# Patient Record
Sex: FEMALE | HISPANIC OR LATINO | Employment: FULL TIME | URBAN - METROPOLITAN AREA
[De-identification: names, ages, dates, MRNs, and addresses within clinical notes are randomized per-mention and may not be internally consistent; named-entity substitution may affect disease eponyms.]

---

## 2021-06-18 ENCOUNTER — PREP FOR SURGERY (OUTPATIENT)
Dept: OTHER | Facility: HOSPITAL | Age: 41
End: 2021-06-18

## 2021-06-18 ENCOUNTER — ANESTHESIA EVENT (OUTPATIENT)
Dept: PERIOP | Facility: HOSPITAL | Age: 41
End: 2021-06-18

## 2021-06-18 ENCOUNTER — PRE-ADMISSION TESTING (OUTPATIENT)
Dept: PREADMISSION TESTING | Facility: HOSPITAL | Age: 41
End: 2021-06-18

## 2021-06-18 VITALS — BODY MASS INDEX: 25.11 KG/M2 | HEIGHT: 61 IN | WEIGHT: 133 LBS

## 2021-06-18 DIAGNOSIS — N92.0 MENORRHAGIA: ICD-10-CM

## 2021-06-18 DIAGNOSIS — N92.0 MENORRHAGIA: Primary | ICD-10-CM

## 2021-06-18 LAB
ABO GROUP BLD: NORMAL
B-HCG UR QL: NEGATIVE
BILIRUB UR QL STRIP: NEGATIVE
BLD GP AB SCN SERPL QL: NEGATIVE
CLARITY UR: CLEAR
COLOR UR: YELLOW
DEPRECATED RDW RBC AUTO: 44.4 FL (ref 37–54)
ERYTHROCYTE [DISTWIDTH] IN BLOOD BY AUTOMATED COUNT: 13.1 % (ref 12.3–15.4)
GLUCOSE UR STRIP-MCNC: NEGATIVE MG/DL
HCT VFR BLD AUTO: 44.5 % (ref 34–46.6)
HGB BLD-MCNC: 14.9 G/DL (ref 12–15.9)
HGB UR QL STRIP.AUTO: NEGATIVE
KETONES UR QL STRIP: ABNORMAL
LEUKOCYTE ESTERASE UR QL STRIP.AUTO: NEGATIVE
MCH RBC QN AUTO: 31 PG (ref 26.6–33)
MCHC RBC AUTO-ENTMCNC: 33.5 G/DL (ref 31.5–35.7)
MCV RBC AUTO: 92.7 FL (ref 79–97)
NITRITE UR QL STRIP: NEGATIVE
PH UR STRIP.AUTO: 6 [PH] (ref 5–8)
PLATELET # BLD AUTO: 336 10*3/MM3 (ref 140–450)
PMV BLD AUTO: 9.5 FL (ref 6–12)
PROT UR QL STRIP: NEGATIVE
RBC # BLD AUTO: 4.8 10*6/MM3 (ref 3.77–5.28)
RH BLD: POSITIVE
SARS-COV-2 RNA NOSE QL NAA+PROBE: NOT DETECTED
SP GR UR STRIP: 1.01 (ref 1–1.03)
T&S EXPIRATION DATE: NORMAL
UROBILINOGEN UR QL STRIP: ABNORMAL
WBC # BLD AUTO: 8.82 10*3/MM3 (ref 3.4–10.8)

## 2021-06-18 PROCEDURE — 86850 RBC ANTIBODY SCREEN: CPT

## 2021-06-18 PROCEDURE — 81003 URINALYSIS AUTO W/O SCOPE: CPT

## 2021-06-18 PROCEDURE — C9803 HOPD COVID-19 SPEC COLLECT: HCPCS

## 2021-06-18 PROCEDURE — 85027 COMPLETE CBC AUTOMATED: CPT

## 2021-06-18 PROCEDURE — U0004 COV-19 TEST NON-CDC HGH THRU: HCPCS

## 2021-06-18 PROCEDURE — 86900 BLOOD TYPING SEROLOGIC ABO: CPT

## 2021-06-18 PROCEDURE — 86901 BLOOD TYPING SEROLOGIC RH(D): CPT

## 2021-06-18 PROCEDURE — 81025 URINE PREGNANCY TEST: CPT

## 2021-06-18 PROCEDURE — 36415 COLL VENOUS BLD VENIPUNCTURE: CPT

## 2021-06-18 RX ORDER — CEFAZOLIN SODIUM 2 G/100ML
2 INJECTION, SOLUTION INTRAVENOUS ONCE
Status: CANCELLED | OUTPATIENT
Start: 2021-06-18 | End: 2021-06-18

## 2021-06-18 RX ORDER — HEPARIN SODIUM 5000 [USP'U]/ML
5000 INJECTION, SOLUTION INTRAVENOUS; SUBCUTANEOUS ONCE
Status: CANCELLED | OUTPATIENT
Start: 2021-06-18 | End: 2021-06-18

## 2021-06-18 RX ORDER — SCOLOPAMINE TRANSDERMAL SYSTEM 1 MG/1
1 PATCH, EXTENDED RELEASE TRANSDERMAL ONCE
Status: CANCELLED | OUTPATIENT
Start: 2021-06-18 | End: 2021-06-18

## 2021-06-18 RX ORDER — ACETAMINOPHEN 500 MG
500 TABLET ORAL EVERY 6 HOURS PRN
COMMUNITY

## 2021-06-18 RX ORDER — FAMOTIDINE 10 MG/ML
20 INJECTION, SOLUTION INTRAVENOUS ONCE
Status: CANCELLED | OUTPATIENT
Start: 2021-06-18 | End: 2021-06-18

## 2021-06-18 NOTE — H&P (VIEW-ONLY)
Vital Signs    LMP: 2021           Weight:  132.4lb (60.06kg)  BMI: 25.95  Height: 60in    BP #1: 126/74       Position: sitting           Vitals performed by:  Marsha Aparicio......................2021 1:01 PM      Current Problems (Reviewed Today)  Preoperative examination (ICD-V72.84) (EBZ14-A98.818)  Fibroids (ICD-218.9) (JPH12-M95.9)  Menorrhagia (ICD-626.2) (UDS00-T67.0)  Annual without abnormal findings (ICD-V72.31) (GHB12-G55.419)    Current Medications (Reviewed Today)  No Known Medications    Current Allergies (Reviewed Today)  No Known Allergies      History of Present Illness  Visit Type: Pre OP  Chief Complaint: TRH BSO    The patient presents with menstrual disorder. The patient complains of heavy periods, painful periods and periods closer together. Associated symptoms include passing clots, soiling clothing/bedding, bleeding limiting ADLs and fatigue. Interval between menses is 21-25 days. Number of pads used per day is > 10 and requires double protection. Menstrual flow lasts > 10 days. Medications used include OCP/patch/ring. Significant history includes fibroids and adenomyosis. Previous diagnostic workup includes ultrasound and endometrial biopsy. Symptoms have been present for several years.       Past Medical History  --- Past Medical History reviewed   none listed     Surgical History  ---- Surgical History reviewed   gallbladder     Family History  ---- Family History reviewed  Diabetes     Gynecologic History  Menarche (yrs):  11  LMP Date: 2021  LMP Flow: Medium  Usual Flow: Medium  Frequency: 28  Duration (days): 3-7    Sexual Health   Sexually Active? Past  Current Contraception: tubal ligation   Hx Tubal Ligation  History of STDs? no    Obstetrical History  : 3  Para: 2  Livin  Miscarriages: 1      Social History  --- Social History reviewed    Risk Factors  Tobacco use: Current every day smoker       Cigarettes: yes            Packs/day: 1/2            Year  started: age 20  Passive smoke exposure:  yes      Genetic History  --- Genetic History reviewed  Patient Ethnicity:  or       Review of Systems    Constitutional: Denies fevers, chills, unintentional weight change.   Skin: Denies rash, suspicious lesions.   Eyes: Denies irritation, vision changes, eye pain.   ENT: Denies earache, nosebleeds, sore throat.   Breast: Denies tenderness, mass, skin changes.   Cardiovascular: Denies palpitations, chest pain, syncope.   Respiratory: Denies cough, dyspnea, wheezing.   GI: Denies nausea, vomiting, constipation.   : Complains of ABNORMAL BLEEDING, MENORRHAGIA. Denies pelvic pain, urinary frequency.   Musculoskeletal: Denies back pain, joint pain, muscle weakness.   Neurological: Denies headache, loss of consciousness, numbness.   Psychological: Denies anxiety, depression, hallucinations.   Endocrine: Denies cold intolerance, heat intolerance, polyuria.   Heme/Lymphatic: Denies abnormal bruising, abnormal bleeding, enlarged lymph nodes.   Allergic/Immun: Denies urticaria, hay fever, persistent infection.     See HPI, All other systems reviewed and are negative.      Physical Exam    General Appearance: no acute distress, healthy appearing, well nourished, well developed.   Abdomen: nontender, nondistended, no guarding, no rebound, no masses.   Vulva: normal appearance, no masses, no lesions.   Urethra: no masses, nontender, no discharge.   Bladder/Renal: nontender, no bladder distension.   Vagina: nontender, no erythema, no masses.   Cervix: normal, no motion tenderness, no lesions, no discharge.   Uterus: normal size, normal shape, nontender, mobile.   Adnexa: no masses, nontender.   Perineum: no masses, no lesions.   Anus/Rectum: normal perianal skin, no masses visible.   Lymphatic: no inguinal lymphadenopathy.   Psych: mood and affect appropriate, normal interaction, content and flow of speech normal.   Skin: no rashes, no lesions.         Assessment &  Plan  - PREOPERATIVE EXAMINATION (DFV89-U30.818)  Plan: Exceeding half of the total visit time, 25 minutes spent with patient discussing possbile diagnosis and treatment options in addition to any other services that may have been provided today.    We are scheduling her for a TRH with a bilateral salp;ingectomy    Services Completed Today  Pre Op Visit-[id number] [[id number]]    Tests Ordered Today  No orders placed    Referrals Placed Today  No orders placed  Patient's diagnosis and possible treatment options were again reviewed, including lesser invasive options.  The risks, benefits and likely outcomes of each were reviewed.  The patient confirms her desire to proceed with TRH with possible BSO.  The procedure was again defined as robotic removal of uterus, cervix and possible removal of the fallopian tubes and ovaries.  She has asked me to use my judgement the the decision to remove the ovaries at the time of surgical exploration.  I have informed her that clinical suspicion of endometriosis cannot always be confirmed with pathological examination of the surgical specimen.  Recognized risks of this procedure are those of any laparoscopic surgical procedure including anesthesia, bleeding, infection, damage to bowel or bladder and neurologic damage.  Specific risks of this procedure were also reviewed including ureteral damage, DVT and its sequelae, vaginal cuff hematoma or infection, wound dehiscence, ovarian remnants, as well as the general management of these complications.  She was informed of the possible need to convert the laparoscopic procedure to an open procedure after the induction of anesthesia.  Possible indications for post operative ERT and its risks and benefits were reviewed.  She was also advised that bleeding, bowel injury, bladder injury or ureteral injury may not be obvious at the time of surgery and that she would need to be viligent to call my office ASAP with any increasing abdominopelvic pain  or any fever in the two weeks following surgery.  All of the patients questions were answered and she was advised to call with any other questions that she may have prior to the operation.        Electronically signed by Aleja Freitas DO on 06/18/2021 at 1:36 PM    ________________________________________________________________________

## 2021-06-18 NOTE — H&P
Vital Signs    LMP: 2021           Weight:  132.4lb (60.06kg)  BMI: 25.95  Height: 60in    BP #1: 126/74       Position: sitting           Vitals performed by:  Marsha Aparicio......................2021 1:01 PM      Current Problems (Reviewed Today)  Preoperative examination (ICD-V72.84) (BIZ19-E39.818)  Fibroids (ICD-218.9) (EPE91-J18.9)  Menorrhagia (ICD-626.2) (CYW60-C65.0)  Annual without abnormal findings (ICD-V72.31) (SXV24-H21.419)    Current Medications (Reviewed Today)  No Known Medications    Current Allergies (Reviewed Today)  No Known Allergies      History of Present Illness  Visit Type: Pre OP  Chief Complaint: TRH BSO    The patient presents with menstrual disorder. The patient complains of heavy periods, painful periods and periods closer together. Associated symptoms include passing clots, soiling clothing/bedding, bleeding limiting ADLs and fatigue. Interval between menses is 21-25 days. Number of pads used per day is > 10 and requires double protection. Menstrual flow lasts > 10 days. Medications used include OCP/patch/ring. Significant history includes fibroids and adenomyosis. Previous diagnostic workup includes ultrasound and endometrial biopsy. Symptoms have been present for several years.       Past Medical History  --- Past Medical History reviewed   none listed     Surgical History  ---- Surgical History reviewed   gallbladder     Family History  ---- Family History reviewed  Diabetes     Gynecologic History  Menarche (yrs):  11  LMP Date: 2021  LMP Flow: Medium  Usual Flow: Medium  Frequency: 28  Duration (days): 3-7    Sexual Health   Sexually Active? Past  Current Contraception: tubal ligation   Hx Tubal Ligation  History of STDs? no    Obstetrical History  : 3  Para: 2  Livin  Miscarriages: 1      Social History  --- Social History reviewed    Risk Factors  Tobacco use: Current every day smoker       Cigarettes: yes            Packs/day: 1/2            Year  started: age 20  Passive smoke exposure:  yes      Genetic History  --- Genetic History reviewed  Patient Ethnicity:  or       Review of Systems    Constitutional: Denies fevers, chills, unintentional weight change.   Skin: Denies rash, suspicious lesions.   Eyes: Denies irritation, vision changes, eye pain.   ENT: Denies earache, nosebleeds, sore throat.   Breast: Denies tenderness, mass, skin changes.   Cardiovascular: Denies palpitations, chest pain, syncope.   Respiratory: Denies cough, dyspnea, wheezing.   GI: Denies nausea, vomiting, constipation.   : Complains of ABNORMAL BLEEDING, MENORRHAGIA. Denies pelvic pain, urinary frequency.   Musculoskeletal: Denies back pain, joint pain, muscle weakness.   Neurological: Denies headache, loss of consciousness, numbness.   Psychological: Denies anxiety, depression, hallucinations.   Endocrine: Denies cold intolerance, heat intolerance, polyuria.   Heme/Lymphatic: Denies abnormal bruising, abnormal bleeding, enlarged lymph nodes.   Allergic/Immun: Denies urticaria, hay fever, persistent infection.     See HPI, All other systems reviewed and are negative.      Physical Exam    General Appearance: no acute distress, healthy appearing, well nourished, well developed.   Abdomen: nontender, nondistended, no guarding, no rebound, no masses.   Vulva: normal appearance, no masses, no lesions.   Urethra: no masses, nontender, no discharge.   Bladder/Renal: nontender, no bladder distension.   Vagina: nontender, no erythema, no masses.   Cervix: normal, no motion tenderness, no lesions, no discharge.   Uterus: normal size, normal shape, nontender, mobile.   Adnexa: no masses, nontender.   Perineum: no masses, no lesions.   Anus/Rectum: normal perianal skin, no masses visible.   Lymphatic: no inguinal lymphadenopathy.   Psych: mood and affect appropriate, normal interaction, content and flow of speech normal.   Skin: no rashes, no lesions.         Assessment &  Plan  - PREOPERATIVE EXAMINATION (WQZ48-O86.818)  Plan: Exceeding half of the total visit time, 25 minutes spent with patient discussing possbile diagnosis and treatment options in addition to any other services that may have been provided today.    We are scheduling her for a TRH with a bilateral salp;ingectomy    Services Completed Today  Pre Op Visit-31548 [[id number]]    Tests Ordered Today  No orders placed    Referrals Placed Today  No orders placed  Patient's diagnosis and possible treatment options were again reviewed, including lesser invasive options.  The risks, benefits and likely outcomes of each were reviewed.  The patient confirms her desire to proceed with TRH with possible BSO.  The procedure was again defined as robotic removal of uterus, cervix and possible removal of the fallopian tubes and ovaries.  She has asked me to use my judgement the the decision to remove the ovaries at the time of surgical exploration.  I have informed her that clinical suspicion of endometriosis cannot always be confirmed with pathological examination of the surgical specimen.  Recognized risks of this procedure are those of any laparoscopic surgical procedure including anesthesia, bleeding, infection, damage to bowel or bladder and neurologic damage.  Specific risks of this procedure were also reviewed including ureteral damage, DVT and its sequelae, vaginal cuff hematoma or infection, wound dehiscence, ovarian remnants, as well as the general management of these complications.  She was informed of the possible need to convert the laparoscopic procedure to an open procedure after the induction of anesthesia.  Possible indications for post operative ERT and its risks and benefits were reviewed.  She was also advised that bleeding, bowel injury, bladder injury or ureteral injury may not be obvious at the time of surgery and that she would need to be viligent to call my office ASAP with any increasing abdominopelvic pain  or any fever in the two weeks following surgery.  All of the patients questions were answered and she was advised to call with any other questions that she may have prior to the operation.        Electronically signed by Aleja Freitas DO on 06/18/2021 at 1:36 PM    ________________________________________________________________________

## 2021-06-18 NOTE — PAT
Patient to apply Chlorhexadine wipes  to surgical area (as instructed) the night before procedure and the AM of procedure. Wipes provided.    Patient instructed to drink 20 ounces (or until full) of Gatorade and it needs to be completed 1 hour before given arrival time for procedure (NO RED Gatorade)    Patient verbalized understanding.    Blood bank bracelet applied to patient during Pre Admission Testing visit.  Patient instructed not to remove from arm until after procedure and they are discharged from the hospital.  Explained to patient that they may shower and get the bracelet wet, but not to immerse under water for longer periods (bathing, swimming, hand dishwashing, etc).  Patient verbalized understanding.    Karla Do done in PAT.

## 2021-06-21 ENCOUNTER — HOSPITAL ENCOUNTER (OUTPATIENT)
Facility: HOSPITAL | Age: 41
Discharge: HOME | End: 2021-06-21
Attending: OBSTETRICS & GYNECOLOGY | Admitting: OBSTETRICS & GYNECOLOGY

## 2021-06-21 ENCOUNTER — ANESTHESIA (OUTPATIENT)
Dept: PERIOP | Facility: HOSPITAL | Age: 41
End: 2021-06-21

## 2021-06-21 VITALS
TEMPERATURE: 97.2 F | HEART RATE: 84 BPM | SYSTOLIC BLOOD PRESSURE: 121 MMHG | HEIGHT: 61 IN | OXYGEN SATURATION: 92 % | DIASTOLIC BLOOD PRESSURE: 69 MMHG | RESPIRATION RATE: 16 BRPM | WEIGHT: 133 LBS | BODY MASS INDEX: 25.11 KG/M2

## 2021-06-21 DIAGNOSIS — D21.9 FIBROIDS: ICD-10-CM

## 2021-06-21 DIAGNOSIS — N92.0 MENORRHAGIA: ICD-10-CM

## 2021-06-21 LAB
ABO GROUP BLD: NORMAL
B-HCG UR QL: NEGATIVE
INTERNAL NEGATIVE CONTROL: NORMAL
INTERNAL POSITIVE CONTROL: NORMAL
Lab: NORMAL
RH BLD: POSITIVE

## 2021-06-21 PROCEDURE — C1889 IMPLANT/INSERT DEVICE, NOC: HCPCS | Performed by: OBSTETRICS & GYNECOLOGY

## 2021-06-21 PROCEDURE — 58571 TLH W/T/O 250 G OR LESS: CPT | Performed by: PHYSICIAN ASSISTANT

## 2021-06-21 PROCEDURE — 86900 BLOOD TYPING SEROLOGIC ABO: CPT

## 2021-06-21 PROCEDURE — 25010000002 FENTANYL CITRATE (PF) 50 MCG/ML SOLUTION: Performed by: NURSE ANESTHETIST, CERTIFIED REGISTERED

## 2021-06-21 PROCEDURE — 25010000002 ONDANSETRON PER 1 MG: Performed by: NURSE ANESTHETIST, CERTIFIED REGISTERED

## 2021-06-21 PROCEDURE — 25010000002 NEOSTIGMINE 10 MG/10ML SOLUTION: Performed by: NURSE ANESTHETIST, CERTIFIED REGISTERED

## 2021-06-21 PROCEDURE — 25010000002 PROPOFOL 10 MG/ML EMULSION: Performed by: NURSE ANESTHETIST, CERTIFIED REGISTERED

## 2021-06-21 PROCEDURE — 88305 TISSUE EXAM BY PATHOLOGIST: CPT | Performed by: OBSTETRICS & GYNECOLOGY

## 2021-06-21 PROCEDURE — 81025 URINE PREGNANCY TEST: CPT | Performed by: ANESTHESIOLOGY

## 2021-06-21 PROCEDURE — 25010000002 DEXAMETHASONE PER 1 MG: Performed by: NURSE ANESTHETIST, CERTIFIED REGISTERED

## 2021-06-21 PROCEDURE — 25010000003 CEFAZOLIN IN DEXTROSE 2-4 GM/100ML-% SOLUTION: Performed by: OBSTETRICS & GYNECOLOGY

## 2021-06-21 PROCEDURE — 25010000002 MIDAZOLAM PER 1 MG: Performed by: NURSE ANESTHETIST, CERTIFIED REGISTERED

## 2021-06-21 PROCEDURE — 25010000002 KETOROLAC TROMETHAMINE PER 15 MG: Performed by: NURSE ANESTHETIST, CERTIFIED REGISTERED

## 2021-06-21 PROCEDURE — 86901 BLOOD TYPING SEROLOGIC RH(D): CPT

## 2021-06-21 PROCEDURE — 25010000002 HEPARIN (PORCINE) PER 1000 UNITS: Performed by: OBSTETRICS & GYNECOLOGY

## 2021-06-21 DEVICE — DEV CONTRL TISS STRATAFIX SPIRAL PD V7 SH 2-0 1/2 30CM: Type: IMPLANTABLE DEVICE | Site: ABDOMEN | Status: FUNCTIONAL

## 2021-06-21 RX ORDER — SCOLOPAMINE TRANSDERMAL SYSTEM 1 MG/1
1 PATCH, EXTENDED RELEASE TRANSDERMAL ONCE
Status: DISCONTINUED | OUTPATIENT
Start: 2021-06-21 | End: 2021-06-21 | Stop reason: HOSPADM

## 2021-06-21 RX ORDER — ALUMINA, MAGNESIA, AND SIMETHICONE 2400; 2400; 240 MG/30ML; MG/30ML; MG/30ML
15 SUSPENSION ORAL EVERY 6 HOURS PRN
Status: CANCELLED | OUTPATIENT
Start: 2021-06-21

## 2021-06-21 RX ORDER — SIMETHICONE 80 MG
80 TABLET,CHEWABLE ORAL 4 TIMES DAILY PRN
Status: CANCELLED | OUTPATIENT
Start: 2021-06-21

## 2021-06-21 RX ORDER — HEPARIN SODIUM 5000 [USP'U]/ML
INJECTION, SOLUTION INTRAVENOUS; SUBCUTANEOUS AS NEEDED
Status: DISCONTINUED | OUTPATIENT
Start: 2021-06-21 | End: 2021-06-21 | Stop reason: HOSPADM

## 2021-06-21 RX ORDER — FENTANYL CITRATE 50 UG/ML
25 INJECTION, SOLUTION INTRAMUSCULAR; INTRAVENOUS
Status: CANCELLED | OUTPATIENT
Start: 2021-06-21

## 2021-06-21 RX ORDER — ACETAMINOPHEN 160 MG/5ML
650 SOLUTION ORAL EVERY 4 HOURS PRN
Status: CANCELLED | OUTPATIENT
Start: 2021-06-21

## 2021-06-21 RX ORDER — ONDANSETRON 2 MG/ML
4 INJECTION INTRAMUSCULAR; INTRAVENOUS ONCE AS NEEDED
Status: DISCONTINUED | OUTPATIENT
Start: 2021-06-21 | End: 2021-06-21 | Stop reason: HOSPADM

## 2021-06-21 RX ORDER — BUPIVACAINE HYDROCHLORIDE AND EPINEPHRINE 5; 5 MG/ML; UG/ML
INJECTION, SOLUTION PERINEURAL AS NEEDED
Status: DISCONTINUED | OUTPATIENT
Start: 2021-06-21 | End: 2021-06-21 | Stop reason: HOSPADM

## 2021-06-21 RX ORDER — KETOROLAC TROMETHAMINE 30 MG/ML
INJECTION, SOLUTION INTRAMUSCULAR; INTRAVENOUS AS NEEDED
Status: DISCONTINUED | OUTPATIENT
Start: 2021-06-21 | End: 2021-06-21 | Stop reason: SURG

## 2021-06-21 RX ORDER — DEXAMETHASONE SODIUM PHOSPHATE 10 MG/ML
INJECTION INTRAMUSCULAR; INTRAVENOUS AS NEEDED
Status: DISCONTINUED | OUTPATIENT
Start: 2021-06-21 | End: 2021-06-21 | Stop reason: SURG

## 2021-06-21 RX ORDER — HEPARIN SODIUM 5000 [USP'U]/ML
5000 INJECTION, SOLUTION INTRAVENOUS; SUBCUTANEOUS ONCE
Status: DISCONTINUED | OUTPATIENT
Start: 2021-06-21 | End: 2021-06-21 | Stop reason: HOSPADM

## 2021-06-21 RX ORDER — DEXAMETHASONE SODIUM PHOSPHATE 4 MG/ML
8 INJECTION, SOLUTION INTRA-ARTICULAR; INTRALESIONAL; INTRAMUSCULAR; INTRAVENOUS; SOFT TISSUE ONCE AS NEEDED
Status: DISCONTINUED | OUTPATIENT
Start: 2021-06-21 | End: 2021-06-21 | Stop reason: HOSPADM

## 2021-06-21 RX ORDER — HYDROCODONE BITARTRATE AND ACETAMINOPHEN 5; 325 MG/1; MG/1
1 TABLET ORAL ONCE AS NEEDED
Status: COMPLETED | OUTPATIENT
Start: 2021-06-21 | End: 2021-06-21

## 2021-06-21 RX ORDER — HYDROMORPHONE HYDROCHLORIDE 1 MG/ML
0.5 INJECTION, SOLUTION INTRAMUSCULAR; INTRAVENOUS; SUBCUTANEOUS
Status: DISCONTINUED | OUTPATIENT
Start: 2021-06-21 | End: 2021-06-21 | Stop reason: HOSPADM

## 2021-06-21 RX ORDER — CIPROFLOXACIN 500 MG/1
500 TABLET, FILM COATED ORAL 2 TIMES DAILY
Qty: 10 TABLET | Refills: 0 | Status: SHIPPED | OUTPATIENT
Start: 2021-06-21 | End: 2021-06-26

## 2021-06-21 RX ORDER — SODIUM CHLORIDE, SODIUM LACTATE, POTASSIUM CHLORIDE, CALCIUM CHLORIDE 600; 310; 30; 20 MG/100ML; MG/100ML; MG/100ML; MG/100ML
9 INJECTION, SOLUTION INTRAVENOUS CONTINUOUS
Status: DISCONTINUED | OUTPATIENT
Start: 2021-06-21 | End: 2021-06-21 | Stop reason: HOSPADM

## 2021-06-21 RX ORDER — ONDANSETRON 2 MG/ML
4 INJECTION INTRAMUSCULAR; INTRAVENOUS EVERY 6 HOURS PRN
Status: CANCELLED | OUTPATIENT
Start: 2021-06-21

## 2021-06-21 RX ORDER — FENTANYL CITRATE 50 UG/ML
50 INJECTION, SOLUTION INTRAMUSCULAR; INTRAVENOUS
Status: DISCONTINUED | OUTPATIENT
Start: 2021-06-21 | End: 2021-06-21 | Stop reason: HOSPADM

## 2021-06-21 RX ORDER — IBUPROFEN 800 MG/1
800 TABLET ORAL EVERY 6 HOURS PRN
Qty: 30 TABLET | Refills: 1 | Status: SHIPPED | OUTPATIENT
Start: 2021-06-21 | End: 2021-07-21

## 2021-06-21 RX ORDER — SODIUM CHLORIDE 0.9 % (FLUSH) 0.9 %
10 SYRINGE (ML) INJECTION EVERY 12 HOURS SCHEDULED
Status: DISCONTINUED | OUTPATIENT
Start: 2021-06-21 | End: 2021-06-21 | Stop reason: HOSPADM

## 2021-06-21 RX ORDER — MIDAZOLAM HYDROCHLORIDE 1 MG/ML
1 INJECTION INTRAMUSCULAR; INTRAVENOUS
Status: DISCONTINUED | OUTPATIENT
Start: 2021-06-21 | End: 2021-06-21 | Stop reason: HOSPADM

## 2021-06-21 RX ORDER — NALOXONE HCL 0.4 MG/ML
0.4 VIAL (ML) INJECTION
Status: CANCELLED | OUTPATIENT
Start: 2021-06-21

## 2021-06-21 RX ORDER — PROPOFOL 10 MG/ML
VIAL (ML) INTRAVENOUS AS NEEDED
Status: DISCONTINUED | OUTPATIENT
Start: 2021-06-21 | End: 2021-06-21 | Stop reason: SURG

## 2021-06-21 RX ORDER — ONDANSETRON 2 MG/ML
INJECTION INTRAMUSCULAR; INTRAVENOUS AS NEEDED
Status: DISCONTINUED | OUTPATIENT
Start: 2021-06-21 | End: 2021-06-21 | Stop reason: SURG

## 2021-06-21 RX ORDER — MAGNESIUM HYDROXIDE 1200 MG/15ML
LIQUID ORAL AS NEEDED
Status: DISCONTINUED | OUTPATIENT
Start: 2021-06-21 | End: 2021-06-21 | Stop reason: HOSPADM

## 2021-06-21 RX ORDER — KETOROLAC TROMETHAMINE 15 MG/ML
15 INJECTION, SOLUTION INTRAMUSCULAR; INTRAVENOUS EVERY 6 HOURS
Status: DISCONTINUED | OUTPATIENT
Start: 2021-06-21 | End: 2021-06-21 | Stop reason: HOSPADM

## 2021-06-21 RX ORDER — LIDOCAINE HYDROCHLORIDE 10 MG/ML
0.5 INJECTION, SOLUTION EPIDURAL; INFILTRATION; INTRACAUDAL; PERINEURAL ONCE AS NEEDED
Status: COMPLETED | OUTPATIENT
Start: 2021-06-21 | End: 2021-06-21

## 2021-06-21 RX ORDER — KETOROLAC TROMETHAMINE 30 MG/ML
30 INJECTION, SOLUTION INTRAMUSCULAR; INTRAVENOUS EVERY 6 HOURS PRN
Status: CANCELLED | OUTPATIENT
Start: 2021-06-21 | End: 2021-06-21

## 2021-06-21 RX ORDER — NEOSTIGMINE METHYLSULFATE 1 MG/ML
INJECTION, SOLUTION INTRAVENOUS AS NEEDED
Status: DISCONTINUED | OUTPATIENT
Start: 2021-06-21 | End: 2021-06-21 | Stop reason: SURG

## 2021-06-21 RX ORDER — ONDANSETRON 4 MG/1
4 TABLET, FILM COATED ORAL EVERY 6 HOURS PRN
Status: CANCELLED | OUTPATIENT
Start: 2021-06-21

## 2021-06-21 RX ORDER — HYDROCODONE BITARTRATE AND ACETAMINOPHEN 5; 325 MG/1; MG/1
1 TABLET ORAL EVERY 6 HOURS PRN
Qty: 10 TABLET | Refills: 0 | Status: SHIPPED | OUTPATIENT
Start: 2021-06-21 | End: 2021-07-01

## 2021-06-21 RX ORDER — ROCURONIUM BROMIDE 10 MG/ML
INJECTION, SOLUTION INTRAVENOUS AS NEEDED
Status: DISCONTINUED | OUTPATIENT
Start: 2021-06-21 | End: 2021-06-21 | Stop reason: SURG

## 2021-06-21 RX ORDER — HYDROCODONE BITARTRATE AND ACETAMINOPHEN 5; 325 MG/1; MG/1
1 TABLET ORAL EVERY 4 HOURS PRN
Status: CANCELLED | OUTPATIENT
Start: 2021-06-21 | End: 2021-06-28

## 2021-06-21 RX ORDER — ESMOLOL HYDROCHLORIDE 10 MG/ML
INJECTION INTRAVENOUS AS NEEDED
Status: DISCONTINUED | OUTPATIENT
Start: 2021-06-21 | End: 2021-06-21 | Stop reason: SURG

## 2021-06-21 RX ORDER — FAMOTIDINE 20 MG/1
20 TABLET, FILM COATED ORAL ONCE
Status: COMPLETED | OUTPATIENT
Start: 2021-06-21 | End: 2021-06-21

## 2021-06-21 RX ORDER — MIDAZOLAM HYDROCHLORIDE 1 MG/ML
INJECTION INTRAMUSCULAR; INTRAVENOUS AS NEEDED
Status: DISCONTINUED | OUTPATIENT
Start: 2021-06-21 | End: 2021-06-21 | Stop reason: SURG

## 2021-06-21 RX ORDER — CEFAZOLIN SODIUM 2 G/100ML
2 INJECTION, SOLUTION INTRAVENOUS ONCE
Status: COMPLETED | OUTPATIENT
Start: 2021-06-21 | End: 2021-06-21

## 2021-06-21 RX ORDER — LIDOCAINE HYDROCHLORIDE 20 MG/ML
INJECTION, SOLUTION INFILTRATION; PERINEURAL AS NEEDED
Status: DISCONTINUED | OUTPATIENT
Start: 2021-06-21 | End: 2021-06-21 | Stop reason: SURG

## 2021-06-21 RX ORDER — SODIUM CHLORIDE 0.9 % (FLUSH) 0.9 %
10 SYRINGE (ML) INJECTION AS NEEDED
Status: DISCONTINUED | OUTPATIENT
Start: 2021-06-21 | End: 2021-06-21 | Stop reason: HOSPADM

## 2021-06-21 RX ORDER — GLYCOPYRROLATE 0.2 MG/ML
INJECTION INTRAMUSCULAR; INTRAVENOUS AS NEEDED
Status: DISCONTINUED | OUTPATIENT
Start: 2021-06-21 | End: 2021-06-21 | Stop reason: SURG

## 2021-06-21 RX ORDER — ACETAMINOPHEN 500 MG
1000 TABLET ORAL EVERY 8 HOURS
Status: CANCELLED | OUTPATIENT
Start: 2021-06-21

## 2021-06-21 RX ORDER — SODIUM CHLORIDE 9 MG/ML
INJECTION, SOLUTION INTRAVENOUS AS NEEDED
Status: DISCONTINUED | OUTPATIENT
Start: 2021-06-21 | End: 2021-06-21 | Stop reason: HOSPADM

## 2021-06-21 RX ORDER — FENTANYL CITRATE 50 UG/ML
INJECTION, SOLUTION INTRAMUSCULAR; INTRAVENOUS AS NEEDED
Status: DISCONTINUED | OUTPATIENT
Start: 2021-06-21 | End: 2021-06-21 | Stop reason: SURG

## 2021-06-21 RX ADMIN — DEXAMETHASONE SODIUM PHOSPHATE 4 MG: 10 INJECTION INTRAMUSCULAR; INTRAVENOUS at 12:41

## 2021-06-21 RX ADMIN — FENTANYL CITRATE 25 MCG: 50 INJECTION INTRAMUSCULAR; INTRAVENOUS at 14:37

## 2021-06-21 RX ADMIN — LIDOCAINE HYDROCHLORIDE 50 MG: 20 INJECTION, SOLUTION INFILTRATION; PERINEURAL at 12:11

## 2021-06-21 RX ADMIN — ESMOLOL HYDROCHLORIDE 10 MG: 10 INJECTION, SOLUTION INTRAVENOUS at 12:32

## 2021-06-21 RX ADMIN — NEOSTIGMINE 2.5 MG: 1 INJECTION INTRAVENOUS at 13:27

## 2021-06-21 RX ADMIN — ESMOLOL HYDROCHLORIDE 10 MG: 10 INJECTION, SOLUTION INTRAVENOUS at 12:36

## 2021-06-21 RX ADMIN — SODIUM CHLORIDE, POTASSIUM CHLORIDE, SODIUM LACTATE AND CALCIUM CHLORIDE 9 ML/HR: 600; 310; 30; 20 INJECTION, SOLUTION INTRAVENOUS at 10:20

## 2021-06-21 RX ADMIN — FENTANYL CITRATE 50 MCG: 50 INJECTION, SOLUTION INTRAMUSCULAR; INTRAVENOUS at 12:19

## 2021-06-21 RX ADMIN — FAMOTIDINE 20 MG: 20 TABLET ORAL at 10:31

## 2021-06-21 RX ADMIN — LIDOCAINE HYDROCHLORIDE 0.5 ML: 10 INJECTION, SOLUTION EPIDURAL; INFILTRATION; INTRACAUDAL; PERINEURAL at 10:20

## 2021-06-21 RX ADMIN — HYDROCODONE BITARTRATE AND ACETAMINOPHEN 1 TABLET: 5; 325 TABLET ORAL at 14:11

## 2021-06-21 RX ADMIN — ROCURONIUM BROMIDE 10 MG: 10 INJECTION, SOLUTION INTRAVENOUS at 12:56

## 2021-06-21 RX ADMIN — KETOROLAC TROMETHAMINE 30 MG: 30 INJECTION, SOLUTION INTRAMUSCULAR; INTRAVENOUS at 13:29

## 2021-06-21 RX ADMIN — SCOPALAMINE 1 PATCH: 1 PATCH, EXTENDED RELEASE TRANSDERMAL at 10:32

## 2021-06-21 RX ADMIN — ROCURONIUM BROMIDE 50 MG: 10 INJECTION, SOLUTION INTRAVENOUS at 12:11

## 2021-06-21 RX ADMIN — FENTANYL CITRATE 50 MCG: 50 INJECTION, SOLUTION INTRAMUSCULAR; INTRAVENOUS at 12:30

## 2021-06-21 RX ADMIN — CEFAZOLIN SODIUM 2 G: 10 INJECTION, POWDER, FOR SOLUTION INTRAVENOUS at 12:06

## 2021-06-21 RX ADMIN — ONDANSETRON 4 MG: 2 INJECTION INTRAMUSCULAR; INTRAVENOUS at 13:22

## 2021-06-21 RX ADMIN — GLYCOPYRROLATE 0.4 MG: 0.4 INJECTION INTRAMUSCULAR; INTRAVENOUS at 13:27

## 2021-06-21 RX ADMIN — FENTANYL CITRATE 25 MCG: 50 INJECTION INTRAMUSCULAR; INTRAVENOUS at 14:15

## 2021-06-21 RX ADMIN — PROPOFOL 175 MG: 10 INJECTION, EMULSION INTRAVENOUS at 12:11

## 2021-06-21 RX ADMIN — FENTANYL CITRATE 50 MCG: 50 INJECTION INTRAMUSCULAR; INTRAVENOUS at 13:58

## 2021-06-21 RX ADMIN — MIDAZOLAM HYDROCHLORIDE 2 MG: 1 INJECTION, SOLUTION INTRAMUSCULAR; INTRAVENOUS at 12:06

## 2021-06-21 RX ADMIN — PROPOFOL 25 MCG/KG/MIN: 10 INJECTION, EMULSION INTRAVENOUS at 12:14

## 2021-06-21 NOTE — OP NOTE
Total Robotic Hysterectomy with bilateral salpingectomy Operative Dictation Note    Date of Service:  06/21/21 13:35 EDT    Pre-operative diagnosis(es): Menorrhagia  Fibroid Uterus           Post-operative diagnosis(es): Same       Procedure(s): Total Robotic Hysterectomy with bilateral slpingectomy     Surgeon:  Assistant: Dr. Freitas  Surgeons: Surgeon(s) and Role:     * Aleja Freitas DO - Primary    Staff:  Surgeon(s):  Aleja Freitas DO Bufford Hall, PA  Assistant was responsible for performing the following activities: Suction, irrigation, retraction, skin closure, and their assistance was necessary for the success of this case.        Anesthesia: General     IV Fluid: 600 mL       Output: Est. Blood Loss 25mL  Urine Output 250mL         Specimens removed: Specimens     ID Source Type Tests Collected By Collected At Frozen?    A Uterus, Cervix, Bilateral Fallopian Tubes  Tissue · TISSUE PATHOLOGY EXAM   Aleja Freitas DO 6/21/21 1318 No    Description: CERVIX, UTERUS, BILATERAL TUBES    This specimen was not marked as sent.           Complications: None       Intraoperative consult(s):      Condition: stable       Disposition: To PACU and then to home       Operative Findings: Normal appearing uterus, tubes and ovaries    Description of Procedure:    After informed consent was obtained, the patient was placed in the dorsal lithotomy position and prepared and draped in the normal  sterile fashion.  Bimanual exam revealed a normal size uterus with no adnexal fullness appreciated.  A weighted speculum was placed in the patient's vagina. The cervix was grasped with a single-tooth tenaculum. The manipulator was then inserted into the cervix.  The tenaculum and the weighted vaginal speculum were than removed.    Attention was then turned to the abdomen where the skin under the umbilicus was anesthetized with 0.25% marcaine.  This was approximately 1 cm below the umbilicus. A 8 mm incision was than made with the  knife.  Blunt dissection was carried to the fascia with the hemostat.  The Veress needle was then inserted into the abdomen. Placement was confirmed with the water drop and air drawback tests. The abdomen was than insufflated with CO2 gas. Upon achievement of adequate pneumoperitoneum, the Versus needle was withdrawn and a 8 mm disposal and sheath were then advanced in the abdomen.  The trocar was removed and the camera was advanced down the sheath. Two more trocars were placed on the right hand side and one more trocar was placed on the left hand side. These were all 8 mm ports. These were all placed under direct visualization.    The patient was placed in steep Trendelenburg. The robot was docked.  I then sat at the console.  Using a [place] bipolar, I removed the right fallopian tube up to the uterine fundus. I then incised the round ligament with the cautery. The broad ligament was then opened up.  The anterior leaf was formed and a bladder flap was created on that side to the midline. The posterior leaf was then incised down to the uterosacral ligament on that side. The uterine artery was seen, grasped and was cauterized in multiple places and dissected off the uterus.    Attention was then turned to the left-hand side where the same technique was utilized. The left fallopian tube was removed up to the uterine fundus, I then cauterized and incised the round ligament. The broad ligament was then opened.  The anterior leaf was then brought to meet the previous incisions. The posterior leaf was dissected to the uterosacral ligament.  The uterine artery was than grasped on that side, cauterized in multiple places and dissected off the uterus.  The bladder was then dissected further off the cervix.  Monopolar otis were then used to enter the posterior aspect of the vagina.  This was brought around laterally and superiorly. Using a tenaculum, the uterus, cervix, and tubes were removed.    The vaginal cuff was then  closed with two figure of eight stitches of 2-0 PDS on each side of the vaginal cuff. The remainder of the cuff was closed with a V-Lock 2-0 Polyglyconate  Suture.  There was no bleeding noted from the vaginal cuff. The ureters were identified bilaterally and followed to the bladder.  Good periostalis was seen bilaterally.      The robot was then undocked. The trocars were removed. The skin incisions were closed with 4-0 vicryl.    All instruments were then removed from the patients abdomen and vagina, Sponge, needle and lap counts were correct X 2.  The patient was taken to recovery room in stable condition. The patient tolerated the procedure well.    Aleja Freitas DO  06/21/21  13:35 EDT

## 2021-06-21 NOTE — ANESTHESIA PREPROCEDURE EVALUATION
Anesthesia Evaluation     Patient summary reviewed and Nursing notes reviewed   no history of anesthetic complications:  NPO Solid Status: > 8 hours  NPO Liquid Status: > 2 hours           Airway   Mallampati: I  TM distance: >3 FB  Neck ROM: full  No difficulty expected  Dental - normal exam     Pulmonary - normal exam   (+) a smoker Current,   Cardiovascular - negative cardio ROS and normal exam        Neuro/Psych- negative ROS  GI/Hepatic/Renal/Endo    (+)  GERD well controlled,    (-) liver disease, no renal disease, diabetes, no thyroid disorder    Musculoskeletal (-) negative ROS    Abdominal    Substance History      OB/GYN      Comment: menorrhagia      Other - negative ROS                     Anesthesia Plan    ASA 2     general   (Low dose propofol gtt for anti-nausea tech)  intravenous induction     Anesthetic plan, all risks, benefits, and alternatives have been provided, discussed and informed consent has been obtained with: patient.    Plan discussed with CRNA.

## 2021-06-21 NOTE — ANESTHESIA PROCEDURE NOTES
Airway  Urgency: elective    Date/Time: 6/21/2021 12:13 PM  Airway not difficult    General Information and Staff    Patient location during procedure: OR    Indications and Patient Condition  Indications for airway management: airway protection    Preoxygenated: yes  Mask difficulty assessment: 1 - vent by mask    Final Airway Details  Final airway type: endotracheal airway      Successful airway: ETT  Cuffed: yes   Successful intubation technique: direct laryngoscopy  Facilitating devices/methods: intubating stylet  Endotracheal tube insertion site: oral  Blade: [place]  Blade size: 2  ETT size (mm): 7.0  Cormack-Lehane Classification: grade IIb - view of arytenoids or posterior of glottis only  Placement verified by: chest auscultation and capnometry   Measured from: lips  ETT/EBT  to lips (cm): 21  Number of attempts at approach: 1  Assessment: lips, teeth, and gum same as pre-op and atraumatic intubation

## 2021-06-21 NOTE — INTERVAL H&P NOTE
"[facility] Pre-op    Full history and physical note from office is attached.    /76 (BP Location: Right arm, Patient Position: Lying)   Pulse 66   Temp 97.7 °F (36.5 °C) (Temporal)   Resp 16   Ht 154.9 cm (61\")   Wt 60.3 kg (133 lb)   LMP 05/31/2021 (Approximate)   SpO2 100%   BMI 25.13 kg/m²     Immunizations:  Influenza:  2020  Pneumococcal:  No  Tetanus:  Unknown  Covid x2: 2021    LAB Results:  Lab Results   Component Value Date    WBC 8.82 06/18/2021    HGB 14.9 06/18/2021    HCT 44.5 06/18/2021    MCV 92.7 06/18/2021     06/18/2021       Cancer Staging (if applicable)  Cancer Patient: __ yes __no __unknown__N/A; If yes, clinical stage T:__ N:__M:__, stage group or __N/A      Impression: Fibroids, menorrhagia       Plan: TOTAL LAPAROSCOPIC HYSTERECTOMY BILATERAL SALPINGECTOMY WITH DAVINCI ROBOT      AUBREE San   6/21/2021   10:30 EDT   "

## 2021-06-21 NOTE — ANESTHESIA POSTPROCEDURE EVALUATION
Patient: Susie Carcamo    Procedure Summary     Date: 06/21/21 Room / Location:  [facility] /  [facility]    Anesthesia Start: 1206 Anesthesia Stop: 1346    Procedure: TOTAL LAPAROSCOPIC HYSTERECTOMY BILATERAL SALPINGECTOMY WITH DAVINCI ROBOT (N/A Abdomen) Diagnosis:     Surgeons: Aleja Freitas DO Provider: Bridgett Sanchez MD    Anesthesia Type: general ASA Status: 2          Anesthesia Type: general    Vitals  Vitals Value Taken Time   /84 06/21/21 1344   Temp     Pulse 89 06/21/21 1346   Resp     SpO2     Vitals shown include unvalidated device data.        Post Anesthesia Care and Evaluation    Patient location during evaluation: PACU  Patient participation: complete - patient participated  Level of consciousness: responsive to verbal stimuli  Pain management: adequate  Airway patency: patent  Anesthetic complications: No anesthetic complications  PONV Status: none  Cardiovascular status: hemodynamically stable and acceptable  Respiratory status: nonlabored ventilation, acceptable and nasal cannula  Hydration status: acceptable

## 2021-06-23 LAB
CYTO UR: NORMAL
LAB AP CASE REPORT: NORMAL
LAB AP CLINICAL INFORMATION: NORMAL
PATH REPORT.FINAL DX SPEC: NORMAL
PATH REPORT.GROSS SPEC: NORMAL

## 2024-04-10 ENCOUNTER — TRANSCRIBE ORDERS (OUTPATIENT)
Dept: LAB | Facility: HOSPITAL | Age: 44
End: 2024-04-10
Payer: COMMERCIAL

## 2024-04-10 ENCOUNTER — TRANSCRIBE ORDERS (OUTPATIENT)
Dept: ADMINISTRATIVE | Facility: HOSPITAL | Age: 44
End: 2024-04-10
Payer: COMMERCIAL

## 2024-04-10 ENCOUNTER — LAB (OUTPATIENT)
Dept: LAB | Facility: HOSPITAL | Age: 44
End: 2024-04-10
Payer: COMMERCIAL

## 2024-04-10 DIAGNOSIS — R20.0 TACTILE ANESTHESIA: ICD-10-CM

## 2024-04-10 DIAGNOSIS — N95.1 SYMPTOMATIC MENOPAUSAL OR FEMALE CLIMACTERIC STATES: Primary | ICD-10-CM

## 2024-04-10 DIAGNOSIS — N95.1 SYMPTOMATIC MENOPAUSAL OR FEMALE CLIMACTERIC STATES: ICD-10-CM

## 2024-04-10 DIAGNOSIS — Z12.31 OTHER SCREENING MAMMOGRAM: Primary | ICD-10-CM

## 2024-04-10 LAB
25(OH)D3 SERPL-MCNC: 19.9 NG/ML (ref 30–100)
ALBUMIN SERPL-MCNC: 4.8 G/DL (ref 3.5–5.2)
ALBUMIN/GLOB SERPL: 1.5 G/DL
ALP SERPL-CCNC: 91 U/L (ref 39–117)
ALT SERPL W P-5'-P-CCNC: 26 U/L (ref 1–33)
ANION GAP SERPL CALCULATED.3IONS-SCNC: 12 MMOL/L (ref 5–15)
AST SERPL-CCNC: 22 U/L (ref 1–32)
BILIRUB SERPL-MCNC: 0.2 MG/DL (ref 0–1.2)
BUN SERPL-MCNC: 13 MG/DL (ref 6–20)
BUN/CREAT SERPL: 22.4 (ref 7–25)
CALCIUM SPEC-SCNC: 9.5 MG/DL (ref 8.6–10.5)
CHLORIDE SERPL-SCNC: 102 MMOL/L (ref 98–107)
CHOLEST SERPL-MCNC: 305 MG/DL (ref 0–200)
CO2 SERPL-SCNC: 26 MMOL/L (ref 22–29)
CREAT SERPL-MCNC: 0.58 MG/DL (ref 0.57–1)
DEPRECATED RDW RBC AUTO: 41.9 FL (ref 37–54)
EGFRCR SERPLBLD CKD-EPI 2021: 113.9 ML/MIN/1.73
ERYTHROCYTE [DISTWIDTH] IN BLOOD BY AUTOMATED COUNT: 12.8 % (ref 12.3–15.4)
ESTRADIOL SERPL HS-MCNC: 103 PG/ML
FSH SERPL-ACNC: 19.4 MIU/ML
GLOBULIN UR ELPH-MCNC: 3.2 GM/DL
GLUCOSE SERPL-MCNC: 94 MG/DL (ref 65–99)
HBA1C MFR BLD: 6 % (ref 4.8–5.6)
HCT VFR BLD AUTO: 42.8 % (ref 34–46.6)
HDLC SERPL QL: 5.08
HDLC SERPL-MCNC: 60 MG/DL (ref 40–60)
HGB BLD-MCNC: 14.9 G/DL (ref 12–15.9)
LDLC SERPL CALC-MCNC: 207 MG/DL (ref 0–100)
LH SERPL-ACNC: 33.1 MIU/ML
MCH RBC QN AUTO: 31.4 PG (ref 26.6–33)
MCHC RBC AUTO-ENTMCNC: 34.8 G/DL (ref 31.5–35.7)
MCV RBC AUTO: 90.1 FL (ref 79–97)
PLATELET # BLD AUTO: 347 10*3/MM3 (ref 140–450)
PMV BLD AUTO: 10.2 FL (ref 6–12)
POTASSIUM SERPL-SCNC: 4.6 MMOL/L (ref 3.5–5.2)
PROT SERPL-MCNC: 8 G/DL (ref 6–8.5)
RBC # BLD AUTO: 4.75 10*6/MM3 (ref 3.77–5.28)
SODIUM SERPL-SCNC: 140 MMOL/L (ref 136–145)
TRIGL SERPL-MCNC: 200 MG/DL (ref 0–150)
TSH SERPL DL<=0.05 MIU/L-ACNC: 1.69 UIU/ML (ref 0.27–4.2)
VLDLC SERPL-MCNC: 38 MG/DL (ref 5–40)
WBC NRBC COR # BLD AUTO: 8.46 10*3/MM3 (ref 3.4–10.8)

## 2024-04-10 PROCEDURE — 80050 GENERAL HEALTH PANEL: CPT

## 2024-04-10 PROCEDURE — 82670 ASSAY OF TOTAL ESTRADIOL: CPT

## 2024-04-10 PROCEDURE — 80061 LIPID PANEL: CPT

## 2024-04-10 PROCEDURE — 82306 VITAMIN D 25 HYDROXY: CPT | Performed by: NURSE PRACTITIONER

## 2024-04-10 PROCEDURE — 83002 ASSAY OF GONADOTROPIN (LH): CPT

## 2024-04-10 PROCEDURE — 83036 HEMOGLOBIN GLYCOSYLATED A1C: CPT

## 2024-04-10 PROCEDURE — 83001 ASSAY OF GONADOTROPIN (FSH): CPT

## 2024-04-10 PROCEDURE — 36415 COLL VENOUS BLD VENIPUNCTURE: CPT

## 2024-04-26 ENCOUNTER — TRANSCRIBE ORDERS (OUTPATIENT)
Dept: DIABETES SERVICES | Facility: HOSPITAL | Age: 44
End: 2024-04-26
Payer: COMMERCIAL

## 2024-04-26 DIAGNOSIS — R73.03 PRE-DIABETES: Primary | ICD-10-CM

## (undated) DEVICE — ADHS SKIN PREMIERPRO EXOFIN TOPICAL HI/VISC .5ML

## (undated) DEVICE — PATIENT RETURN ELECTRODE, SINGLE-USE, CONTACT QUALITY MONITORING, ADULT, WITH 9FT CORD, FOR PATIENTS WEIGING OVER 33LBS. (15KG): Brand: MEGADYNE

## (undated) DEVICE — MANIP UTER ADVINCULA DELINEATOR SFT/CUP 3.5CM

## (undated) DEVICE — ENDOPATH PNEUMONEEDLE INSUFFLATION NEEDLES WITH LUER LOCK CONNECTORS 120MM: Brand: ENDOPATH

## (undated) DEVICE — SNAP KOVER: Brand: UNBRANDED

## (undated) DEVICE — COLUMN DRAPE

## (undated) DEVICE — GLV SURG SENSICARE PI MIC PF SZ6.5 LF STRL

## (undated) DEVICE — CANNULA SEAL

## (undated) DEVICE — ARM DRAPE

## (undated) DEVICE — PK MAJ GYN DAVINCI 10

## (undated) DEVICE — APPL CHLORAPREP TINTED 26ML TEAL

## (undated) DEVICE — SUT MNCRYL PLS ANTIB UD 3/0 PS2 27IN

## (undated) DEVICE — SYR LL TP 10ML STRL

## (undated) DEVICE — ST TBG CONN PNEUMOCLEAR EVAC SMOKE HEAT/HUMID

## (undated) DEVICE — BLADELESS OBTURATOR: Brand: WECK VISTA

## (undated) DEVICE — DRAPE,TOP,102X53,STERILE: Brand: MEDLINE

## (undated) DEVICE — TIP COVER ACCESSORY

## (undated) DEVICE — SUT PDS 2/0 CT2 27IN VIL PDP333H

## (undated) DEVICE — BLANKT WARM UPPR/BDY ARM/OUT 57X196CM

## (undated) DEVICE — KT POSTN TRENDELENBURG NBXL PAD WING STRAP TABL HDRST XLG